# Patient Record
Sex: MALE | Race: WHITE | NOT HISPANIC OR LATINO | Employment: FULL TIME | URBAN - NONMETROPOLITAN AREA
[De-identification: names, ages, dates, MRNs, and addresses within clinical notes are randomized per-mention and may not be internally consistent; named-entity substitution may affect disease eponyms.]

---

## 2023-04-29 ENCOUNTER — OFFICE VISIT (OUTPATIENT)
Dept: URGENT CARE | Facility: PHYSICIAN GROUP | Age: 58
End: 2023-04-29
Payer: COMMERCIAL

## 2023-04-29 VITALS
SYSTOLIC BLOOD PRESSURE: 110 MMHG | DIASTOLIC BLOOD PRESSURE: 72 MMHG | TEMPERATURE: 97.8 F | RESPIRATION RATE: 14 BRPM | OXYGEN SATURATION: 99 % | HEIGHT: 70 IN | HEART RATE: 66 BPM | WEIGHT: 196 LBS | BODY MASS INDEX: 28.06 KG/M2

## 2023-04-29 DIAGNOSIS — L50.9 HIVES: ICD-10-CM

## 2023-04-29 PROCEDURE — 99203 OFFICE O/P NEW LOW 30 MIN: CPT | Performed by: FAMILY MEDICINE

## 2023-04-29 RX ORDER — PREDNISONE 10 MG/1
30 TABLET ORAL DAILY
Qty: 9 TABLET | Refills: 0 | Status: SHIPPED | OUTPATIENT
Start: 2023-04-29 | End: 2023-05-02

## 2023-04-29 RX ORDER — TRIAMCINOLONE ACETONIDE 1 MG/ML
1 LOTION TOPICAL 2 TIMES DAILY
Qty: 60 ML | Refills: 0 | Status: SHIPPED | OUTPATIENT
Start: 2023-04-29

## 2023-04-29 NOTE — PROGRESS NOTES
"Subjective:      Chief Complaint   Patient presents with    Rash     Chest, both thighs, both arms x1d                Rash  This is a new problem. The current episode started in the past 2 days. The problem is unchanged. The rash is diffuse. The rash is characterized by redness, swelling and itchiness.   Pt denies wheezing, sob, or sensation of throat swelling.    Denies dizziness or lightheadedness.       Pt denies:  Unusual chemical exposure  Change in soaps, detergents, body products, etc  Change in diet  Recent travel       Pertinent negatives include no cough, fatigue, fever, shortness of breath or sore throat. Past treatments include nothing.       No past medical history on file.             No current outpatient medications on file prior to visit.     No current facility-administered medications on file prior to visit.         Review of Systems   Constitutional: Negative for fever and fatigue.   HENT: Negative for sore throat.    Respiratory: Negative for cough and shortness of breath.    Skin: Positive for rash.   All other systems reviewed and are negative.         Objective:   /72   Pulse 66   Temp 36.6 °C (97.8 °F) (Temporal)   Resp 14   Ht 1.765 m (5' 9.5\")   Wt 88.9 kg (196 lb)   SpO2 99%     Physical Exam   Constitutional: pt is oriented to person, place, and time. Pt appears well-developed and well-nourished. No distress.   HENT:   Head: Normocephalic and atraumatic.   Mouth/Throat: Oropharynx is clear and moist and mucous membranes are normal. No uvula swelling. No oropharyngeal exudate, posterior oropharyngeal edema or posterior oropharyngeal erythema.   Eyes: Conjunctivae are normal.   Cardiovascular: Normal rate, regular rhythm and normal heart sounds.    Pulmonary/Chest: Effort normal and breath sounds normal. No respiratory distress. She has no wheezes.   Neurological: pt is alert and oriented to person, place, and time. No cranial nerve deficit.   Skin: Rash - diffuse evanescent " macules and wheals over trunk and bilat extremities   noted. Pt is not diaphoretic. There is erythema.   Psychiatric: pt's behavior is normal.   Nursing note and vitals reviewed.              Assessment/Plan:        -  1. Hives  Allegra qd  - triamcinolone (KENALOG) 0.1 % lotion; Apply 1 Application. topically 2 times a day.  Dispense: 60 mL; Refill: 0  - predniSONE (DELTASONE) 10 MG Tab; Take 3 Tablets by mouth every day for 3 days.  Dispense: 9 Tablet; Refill: 0    Differential diagnosis, natural history, supportive care, and indications for immediate follow-up discussed. All questions answered. Patient agrees with the plan of care.     Follow-up as needed if symptoms worsen or fail to improve to PCP, Urgent care or Emergency Room.     I have personally reviewed prior external notes and test results pertinent to today's visit.  I have independently reviewed and interpreted all diagnostics ordered during this urgent care acute visit.